# Patient Record
Sex: MALE
[De-identification: names, ages, dates, MRNs, and addresses within clinical notes are randomized per-mention and may not be internally consistent; named-entity substitution may affect disease eponyms.]

---

## 2018-07-05 ENCOUNTER — HOSPITAL ENCOUNTER (EMERGENCY)
Dept: HOSPITAL 5 - ED | Age: 43
LOS: 4 days | Discharge: PSYCHIATRIC OR SUBSTANCE USE FACILITY | End: 2018-07-09
Payer: MEDICARE

## 2018-07-05 DIAGNOSIS — F12.10: ICD-10-CM

## 2018-07-05 DIAGNOSIS — F29: Primary | ICD-10-CM

## 2018-07-05 DIAGNOSIS — Z79.82: ICD-10-CM

## 2018-07-05 DIAGNOSIS — F15.10: ICD-10-CM

## 2018-07-05 DIAGNOSIS — I10: ICD-10-CM

## 2018-07-05 DIAGNOSIS — F17.200: ICD-10-CM

## 2018-07-05 LAB
ALBUMIN SERPL-MCNC: 4.2 G/DL (ref 3.9–5)
ALT SERPL-CCNC: 22 UNITS/L (ref 7–56)
BUN SERPL-MCNC: 10 MG/DL (ref 9–20)
BUN/CREAT SERPL: 9 %
CALCIUM SERPL-MCNC: 9.8 MG/DL (ref 8.4–10.2)
HCT VFR BLD CALC: 45.5 % (ref 35.5–45.6)
HEMOLYSIS INDEX: 8
HGB BLD-MCNC: 15.2 GM/DL (ref 11.8–15.2)
MCH RBC QN AUTO: 29 PG (ref 28–32)
MCHC RBC AUTO-ENTMCNC: 33 % (ref 32–34)
MCV RBC AUTO: 88 FL (ref 84–94)
PLATELET # BLD: 292 K/MM3 (ref 140–440)
RBC # BLD AUTO: 5.19 M/MM3 (ref 3.65–5.03)

## 2018-07-05 PROCEDURE — 96372 THER/PROPH/DIAG INJ SC/IM: CPT

## 2018-07-05 PROCEDURE — 99285 EMERGENCY DEPT VISIT HI MDM: CPT

## 2018-07-05 PROCEDURE — 81001 URINALYSIS AUTO W/SCOPE: CPT

## 2018-07-05 PROCEDURE — G0480 DRUG TEST DEF 1-7 CLASSES: HCPCS

## 2018-07-05 PROCEDURE — 80061 LIPID PANEL: CPT

## 2018-07-05 PROCEDURE — 80320 DRUG SCREEN QUANTALCOHOLS: CPT

## 2018-07-05 PROCEDURE — 80053 COMPREHEN METABOLIC PANEL: CPT

## 2018-07-05 PROCEDURE — 82550 ASSAY OF CK (CPK): CPT

## 2018-07-05 PROCEDURE — 85027 COMPLETE CBC AUTOMATED: CPT

## 2018-07-05 PROCEDURE — 36415 COLL VENOUS BLD VENIPUNCTURE: CPT

## 2018-07-05 PROCEDURE — 80307 DRUG TEST PRSMV CHEM ANLYZR: CPT

## 2018-07-05 PROCEDURE — 93005 ELECTROCARDIOGRAM TRACING: CPT

## 2018-07-05 PROCEDURE — 83036 HEMOGLOBIN GLYCOSYLATED A1C: CPT

## 2018-07-05 PROCEDURE — 93010 ELECTROCARDIOGRAM REPORT: CPT

## 2018-07-05 NOTE — EMERGENCY DEPARTMENT REPORT
ED General Adult HPI





- General


Chief complaint: Medical Clearance


Stated complaint: MENTAL EVAL


Time Seen by Provider: 07/05/18 13:24


Source: patient, police, RN notes reviewed


Mode of arrival: Ambulatory


Limitations: Other (patient is disorganized and is actively psychotic)





- History of Present Illness


Initial comments: 





This is a 42-year-old male who was unknown to this provider previously, who is 

brought to the hospital by local Police Department for medical clearance.  

Patient has a history of psychiatric disease.  The patient has no complaints at 

this time.  As per Police Department, the patient was aggressive, physically 

combative, and had to be tased 2.  The patient denies headache, neck pain, 

chest pain, abdominal pain, shortness of breath, urinary symptoms, he denies 

homicidality and suicidality, and he reports he is up-to-date with his tetanus 

vaccinations.  The patient has no complaints at this time, and therefore 

indicates no radiation, no qualitative nature of symptoms, and no exacerbating 

or relieving factors.  As per verbal report from local Police Department, the 

patient did not fall or hit his head.











-: unknown


Quality: other (per hpi)


Consistency: other (per hpi)


Improves with: other (per hpi)


Worsens with: other (per hpi)


Associated Symptoms: other (per hpi).  denies: confusion, chest pain, cough, 

diaphoresis, fever/chills, headaches, loss of appetite, malaise, nausea/vomiting

, rash, seizure, shortness of breath, syncope, weakness





- Related Data


 Home Medications











 Medication  Instructions  Recorded  Confirmed  Last Taken


 


Aspirin [Aspirin TAB] 1 PO DAILY 10/08/15 10/08/15 Unknown








 Previous Rx's











 Medication  Instructions  Recorded  Last Taken  Type


 


Amoxicillin/K Clav Tab [Augmentin 1 tab PO Q12HR #20 tab 10/17/15 Unknown Rx





875 mg]    


 


Ibuprofen [Motrin] 600 mg PO Q8H PRN #30 tablet 10/17/15 Unknown Rx


 


Prednisone [predniSONE 10 mg 10 mg PO .TAPER #1 tab.ds.pk 10/17/15 Unknown Rx





(6-Day Pack, 21 Tabs)]    











 Allergies











Allergy/AdvReac Type Severity Reaction Status Date / Time


 


No Known Allergies Allergy   Unverified 10/17/15 12:02














ED Review of Systems


ROS: 


Stated complaint: MENTAL EVAL


Other details as noted in HPI





Comment: All other systems reviewed and negative





ED Past Medical Hx





- Past Medical History


Hx Hypertension: Yes


Hx Psychiatric Treatment: Yes (PTSD)





- Surgical History


Additional Surgical History: L eye.  tonsillectomy





- Social History


Smoking Status: Current Every Day Smoker


Substance Use Type: Marijuana, Methamphetamines





- Medications


Home Medications: 


 Home Medications











 Medication  Instructions  Recorded  Confirmed  Last Taken  Type


 


Aspirin [Aspirin TAB] 1 PO DAILY 10/08/15 10/08/15 Unknown History


 


Amoxicillin/K Clav Tab [Augmentin 1 tab PO Q12HR #20 tab 10/17/15  Unknown Rx





875 mg]     


 


Ibuprofen [Motrin] 600 mg PO Q8H PRN #30 tablet 10/17/15  Unknown Rx


 


Prednisone [predniSONE 10 mg 10 mg PO .TAPER #1 tab.ds.pk 10/17/15  Unknown Rx





(6-Day Pack, 21 Tabs)]     














ED Physical Exam





- General


Limitations: Other (patient is actively psychotic)


General appearance: alert, in no apparent distress





- Head


Head exam: Present: atraumatic, normocephalic





- Eye


Eye exam: Present: normal appearance, PERRL, EOMI.  Absent: nystagmus





- ENT


ENT exam: Present: normal exam, normal orophraynx, mucous membranes moist, 

normal external ear exam





- Neck


Neck exam: Present: normal inspection.  Absent: tenderness, meningismus





- Respiratory


Respiratory exam: Present: normal lung sounds bilaterally.  Absent: respiratory 

distress





- Cardiovascular


Cardiovascular Exam: Present: normal rhythm, tachycardia, normal heart sounds.  

Absent: systolic murmur, diastolic murmur, rubs, gallop





- GI/Abdominal


GI/Abdominal exam: Present: soft, normal bowel sounds.  Absent: distended, 

tenderness, guarding, rebound, rigid, pulsatile mass





- Rectal


Rectal exam: Present: deferred





- Extremities Exam


Extremities exam: Present: normal inspection, full ROM, normal capillary refill

, other (there is no palpable cord.  There is negative Homans sign.  The 

compartments are soft.  There is no clonus.  It is no hyperreflexia.).  Absent: 

tenderness, pedal edema, joint swelling, calf tenderness





- Back Exam


Back exam: Present: normal inspection, full ROM.  Absent: tenderness, CVA 

tenderness (R), paraspinal tenderness, vertebral tenderness





- Neurological Exam


Neurological exam: Present: alert, oriented X3, CN II-XII intact, other (

Extraocular movements intact.  Tongue midline.  No facial droop.  Facial 

sensation intact to light touch in the V1, V2, V3 distribution bilaterally.  5 

and 5 strength in 4 extremities..  Sensation is intact to light touch in 4 

extremities.).  Absent: motor sensory deficit





- Psychiatric


Psychiatric exam: Present: anxious.  Absent: homicidal ideation, suicidal 

ideation





- Skin


Skin exam: Present: warm, dry, intact, normal color.  Absent: rash





ED Course


 Vital Signs











  07/05/18 07/05/18 07/05/18





  13:10 13:30 14:00


 


Temperature 97.7 F  


 


Pulse Rate 113 H 109 H 104 H


 


Respiratory 18 26 H 19





Rate   


 


Blood Pressure 153/91  


 


Blood Pressure 153/91  





[Right]   


 


O2 Sat by Pulse 98 97 98





Oximetry   














  07/05/18 07/05/18 07/05/18





  14:06 14:30 15:00


 


Temperature 99.2 F  


 


Pulse Rate  102 H 94 H


 


Respiratory  29 H 





Rate   


 


Blood Pressure  159/93 141/79


 


Blood Pressure   





[Right]   


 


O2 Sat by Pulse  98 98





Oximetry   














ED Medical Decision Making





- Lab Data


Result diagrams: 


 07/05/18 13:41





 07/05/18 13:41








 Vital Signs











  07/05/18 07/05/18 07/05/18





  13:10 13:30 14:00


 


Temperature 97.7 F  


 


Pulse Rate 113 H 109 H 104 H


 


Respiratory 18 26 H 19





Rate   


 


Blood Pressure 153/91  


 


Blood Pressure 153/91  





[Right]   


 


O2 Sat by Pulse 98 97 98





Oximetry   














  07/05/18 07/05/18 07/05/18





  14:06 14:30 15:00


 


Temperature 99.2 F  


 


Pulse Rate  102 H 94 H


 


Respiratory  29 H 





Rate   


 


Blood Pressure  159/93 141/79


 


Blood Pressure   





[Right]   


 


O2 Sat by Pulse  98 98





Oximetry   











 Lab Results











  07/05/18 07/05/18 07/05/18 Range/Units





  13:41 13:41 13:41 


 


WBC     (4.5-11.0)  K/mm3


 


RBC     (3.65-5.03)  M/mm3


 


Hgb     (11.8-15.2)  gm/dl


 


Hct     (35.5-45.6)  %


 


MCV     (84-94)  fl


 


MCH     (28-32)  pg


 


MCHC     (32-34)  %


 


RDW     (13.2-15.2)  %


 


Plt Count     (140-440)  K/mm3


 


Sodium  141    (137-145)  mmol/L


 


Potassium  4.4    (3.6-5.0)  mmol/L


 


Chloride  101.1    ()  mmol/L


 


Carbon Dioxide  26    (22-30)  mmol/L


 


Anion Gap  18    mmol/L


 


BUN  10    (9-20)  mg/dL


 


Creatinine  1.1    (0.8-1.5)  mg/dL


 


Estimated GFR  > 60    ml/min


 


BUN/Creatinine Ratio  9    %


 


Glucose  117 H    ()  mg/dL


 


Calcium  9.8    (8.4-10.2)  mg/dL


 


Total Bilirubin  0.20    (0.1-1.2)  mg/dL


 


AST  28    (5-40)  units/L


 


ALT  22    (7-56)  units/L


 


Alkaline Phosphatase  118    ()  units/L


 


Total Creatine Kinase     ()  units/L


 


Total Protein  8.0    (6.3-8.2)  g/dL


 


Albumin  4.2    (3.9-5)  g/dL


 


Albumin/Globulin Ratio  1.1    %


 


Salicylates   < 0.3 L   (2.8-20.0)  mg/dL


 


Acetaminophen    < 5.0 L  (10.0-30.0)  ug/mL


 


Plasma/Serum Alcohol     (0-0.07)  %














  07/05/18 07/05/18 07/05/18 Range/Units





  13:41 13:41 13:41 


 


WBC    13.3 H  (4.5-11.0)  K/mm3


 


RBC    5.19 H  (3.65-5.03)  M/mm3


 


Hgb    15.2  (11.8-15.2)  gm/dl


 


Hct    45.5  (35.5-45.6)  %


 


MCV    88  (84-94)  fl


 


MCH    29  (28-32)  pg


 


MCHC    33  (32-34)  %


 


RDW    14.4  (13.2-15.2)  %


 


Plt Count    292  (140-440)  K/mm3


 


Sodium     (137-145)  mmol/L


 


Potassium     (3.6-5.0)  mmol/L


 


Chloride     ()  mmol/L


 


Carbon Dioxide     (22-30)  mmol/L


 


Anion Gap     mmol/L


 


BUN     (9-20)  mg/dL


 


Creatinine     (0.8-1.5)  mg/dL


 


Estimated GFR     ml/min


 


BUN/Creatinine Ratio     %


 


Glucose     ()  mg/dL


 


Calcium     (8.4-10.2)  mg/dL


 


Total Bilirubin     (0.1-1.2)  mg/dL


 


AST     (5-40)  units/L


 


ALT     (7-56)  units/L


 


Alkaline Phosphatase     ()  units/L


 


Total Creatine Kinase  191 H    ()  units/L


 


Total Protein     (6.3-8.2)  g/dL


 


Albumin     (3.9-5)  g/dL


 


Albumin/Globulin Ratio     %


 


Salicylates     (2.8-20.0)  mg/dL


 


Acetaminophen     (10.0-30.0)  ug/mL


 


Plasma/Serum Alcohol   < 0.01   (0-0.07)  %














- EKG Data


-: EKG Interpreted by Me


EKG shows normal: sinus rhythm


Rate: tachycardia





- EKG Data





07/05/18 16:15


Sinus tachycardia, 114 beats per minute, left axis deviation, low voltage, QTC 

prolonged, abnormal EKG, not a STEMI.





- Medical Decision Making





Differential diagnosis, including but not limited to: Psychosis, medical 

clearance for psychiatric placement


Assessment and plan: 42-year-old male who is brought to the hospital by Police 

Department for decompensated psychosis.  He is afebrile, his tachycardia has 

resolved.  His leukocytosis is appreciated, however his history and physical 

examination are not consistent with an acute bacterial infection.  His 

tachycardia resolved, he has been pleasant, calm and cooperative, without any 

neck pain or neck stiffness.  He is alert to name, month, year and location.  

Urinalysis is pending at this time, he is placed on a 1013, his serum 

toxicology studies were unremarkable, creatinine kinase was unremarkable.  At 

this point in time, the patient is medically stable for psychiatric placement, 

evaluation and consultation at this time.











Critical care attestation.: 


If time is entered above; I have spent that time in minutes in the direct care 

of this critically ill patient, excluding procedure time.








ED Disposition


Clinical Impression: 


 Medical clearance for psychiatric admission





Disposition: [id number]/TX-[id number] PSY HOSP/PSY UNIT


Is pt being admited?: No


Does the pt Need Aspirin: No


Condition: Good

## 2018-07-06 LAB
BENZODIAZEPINES SCREEN,URINE: (no result)
BILIRUB UR QL STRIP: (no result)
BLOOD UR QL VISUAL: (no result)
HDLC SERPL-MCNC: 35 MG/DL (ref 40–59)
METHADONE SCREEN,URINE: (no result)
MUCOUS THREADS #/AREA URNS HPF: (no result) /HPF
OPIATE SCREEN,URINE: (no result)
PH UR STRIP: 6 [PH] (ref 5–7)
PROT UR STRIP-MCNC: (no result) MG/DL
RBC #/AREA URNS HPF: 10 /HPF (ref 0–6)
UROBILINOGEN UR-MCNC: < 2 MG/DL (ref ?–2)
WBC #/AREA URNS HPF: 6 /HPF (ref 0–6)

## 2018-07-06 NOTE — CONSULTATION
History of Present Illness





- Reason for Consult


Consult date: 07/06/18


Reason for consult: Mental Health Evaluation


Requesting physician: DAVIN SYKES





- Chief Complaint


Chief complaint: 


"I don't know why I'm here'








- History of Present Psychiatric Illness


42 y.o. white male presenting to the ER for acute psychosis per the local 

police. Today the patient is clam, but tangent during the assessment. He could 

not explain his actions prior to his admission to the ER. He was asked about 

amphetamine use, he stated, "what, what" several times.  His answers to 

questions were not logical. He had loose associations throughout the interview. 

He did acknowledge that "someone or something" is after him. He isn't a good 

historian at this time.  








Medications and Allergies


 Allergies











Allergy/AdvReac Type Severity Reaction Status Date / Time


 


No Known Allergies Allergy   Unverified 10/17/15 12:02











 Home Medications











 Medication  Instructions  Recorded  Confirmed  Last Taken  Type


 


Aspirin [Aspirin TAB] 1 PO DAILY 10/08/15 10/08/15 Unknown History


 


Amoxicillin/K Clav Tab [Augmentin 1 tab PO Q12HR #20 tab 10/17/15  Unknown Rx





875 mg]     


 


Ibuprofen [Motrin] 600 mg PO Q8H PRN #30 tablet 10/17/15  Unknown Rx


 


Prednisone [predniSONE 10 mg 10 mg PO .TAPER #1 tab.ds.pk 10/17/15  Unknown Rx





(6-Day Pack, 21 Tabs)]     











Active Meds: 


Active Medications





Haloperidol Lactate (Haldol)  5 mg IM Q6HR PRN


   PRN Reason: Agitation


Lorazepam (Ativan)  2 mg IM Q4HR PRN


   PRN Reason: Agitation











Past psychiatric history





- Past Medical History


Past Medical History: hypertension


Past Surgical History: tonsillectomy





- past Psychiatric treatment and history


psychiatric treatment history: 


Several inpatient psy settings. He cannot confirm or deny a fam psy hx.








- Social History


Social history: Lives alone





Mental Status Exam





- Vital signs


 Last Vital Signs











Temp  98 F   07/05/18 19:42


 


Pulse  85   07/05/18 19:42


 


Resp  15   07/06/18 11:54


 


BP  156/98   07/05/18 19:42


 


Pulse Ox  95   07/06/18 11:54














- Exam


Narrative exam: 


MSE:





 Appearance: calm 


 Behavior: regular eye contact


 Speech: regular rate and tone


 Mood: "okay"


 Affect: congruent to mood  


 Thought Process: tangential                      


 Thought Content: denies SI/HI's and VH's, would not confirm or deny AH's, 

delusional, paranoid


 Motor Activity: ambulatory 


 Cognition: A/O x 3


 Insight: poor 


 Judgment: poor  








Results


Result Diagrams: 


 07/05/18 13:41





 07/05/18 13:41


 Abnormal lab results











  07/05/18 07/05/18 07/05/18 Range/Units





  13:41 13:41 13:41 


 


WBC     (4.5-11.0)  K/mm3


 


RBC     (3.65-5.03)  M/mm3


 


Glucose  117 H    ()  mg/dL


 


Total Creatine Kinase     ()  units/L


 


Salicylates   < 0.3 L   (2.8-20.0)  mg/dL


 


Acetaminophen    < 5.0 L  (10.0-30.0)  ug/mL














  07/05/18 07/05/18 Range/Units





  13:41 13:41 


 


WBC   13.3 H  (4.5-11.0)  K/mm3


 


RBC   5.19 H  (3.65-5.03)  M/mm3


 


Glucose    ()  mg/dL


 


Total Creatine Kinase  191 H   ()  units/L


 


Salicylates    (2.8-20.0)  mg/dL


 


Acetaminophen    (10.0-30.0)  ug/mL








All other labs normal.








Assessment and Plan


Assessment and plan: 


Impression: Unspecified Psychosis. Substance Use DO (amphetamines). Today the 

patient is clam, but tangent during the assessment. 





DDx: R/O Schizophrenia Paranoid Type, R/O Substance Induced Psychosis





Recommendation/Plan: Continue 1013 with placement to inpatient psy services. 

Start Zyprexa 5 mg PO HS for psychosis. Discussed possible metabolic side 

effects of Zyprexa with patient. The patient's triglycerides are elevated and 

the ER staff was informed. The benefit of Zyprexa at this time outweigh the 

risk because the patient is psychotic.

## 2018-07-07 NOTE — PROGRESS NOTE
Subjective





- Reason for Consult


Consult date: 07/07/18


Reason for consult: follow up





- Chief Complaint


Chief complaint: 


"When am I going?"








42 y.o. white male presenting to the ER for acute psychosis per the local 

police. He asked when he is going to a hospital. He fell asleep when questions 

were asked. He states he should go to [facility]. Unable to obtain further 

information.











Mental Status Exam





- Vital signs


 Last Vital Signs











Temp  97.6 F   07/07/18 09:00


 


Pulse  70   07/07/18 09:00


 


Resp  20   07/07/18 09:00


 


BP  141/83   07/07/18 09:00


 


Pulse Ox  96   07/07/18 09:00














- Exam


Narrative exam: 





MSE:





 Appearance: calm 


 Behavior: intermittently falling asleep


 Speech: regular rate and tone


 Mood: "okay"


 Affect: congruent to mood  


 Thought Process: limited in scope/logical                    


 Thought Content: unable to assess


 Motor Activity: ambulatory 


 Cognition: A/O x 3


 Insight: poor 


 Judgment: poor 





Assessment and Plan








Impression: Unspecified Psychosis. Substance Use DO (amphetamines). Today the 

patient is calm but offers minimal information.





DDx: R/O Schizophrenia Paranoid Type, R/O Substance Induced Psychosis





Recommendation/Plan: Continue 1013 with placement to inpatient psy services. 

Continue Zyprexa 5 mg PO HS for psychosis. Discussed possible metabolic side 

effects of Zyprexa with patient. The patient's triglycerides are elevated and 

the ER staff was informed. The benefit of Zyprexa at this time outweighs the 

risk because the patient is psychotic.

## 2018-07-08 NOTE — PROGRESS NOTE
Subjective





- Reason for Consult


Consult date: 07/08/18


Reason for consult: follow up





- Chief Complaint


Chief complaint: 


"The meth antman..."








42 y.o. white male presenting to the ER for acute psychosis per the local 

police. He states he should go home. When asked how he is doing, he began 

talking about [employer] robot, the meth antman, and having a device inside 

of him. He was unable to be redirected. 











Mental Status Exam





- Vital signs


 Last Vital Signs











Temp  98.2 F   07/08/18 10:00


 


Pulse  82   07/08/18 10:00


 


Resp  18   07/08/18 10:00


 


BP  157/92   07/08/18 10:00


 


Pulse Ox  100   07/08/18 10:00














- Exam


Narrative exam: 





MSE:





 Appearance: hospital gown


 Behavior: needed redirection


 Speech: rapid


 Mood: indifferent


 Affect: congruent to mood  


 Thought Process:    disorganized              


 Thought Content:  bizarre delusions


 Motor Activity: ambulatory 


 Cognition: A/O x 3


 Insight: poor 


 Judgment: poor 





Assessment and Plan








Impression: Unspecified Psychosis. Substance Use DO (amphetamines). Today he is 

disorganized and delusional.





DDx: R/O Schizophrenia Paranoid Type, R/O Substance Induced Psychosis





Recommendation/Plan: Continue 1013 with placement to inpatient psy services. 

Continue Zyprexa 5 mg PO HS for psychosis. Discussed possible metabolic side 

effects of Zyprexa with patient. The patient's triglycerides are elevated and 

the ER staff was informed. The benefit of Zyprexa at this time outweighs the 

risk because the patient is psychotic.

## 2018-07-09 VITALS — SYSTOLIC BLOOD PRESSURE: 132 MMHG | DIASTOLIC BLOOD PRESSURE: 98 MMHG

## 2024-06-04 ENCOUNTER — P2P PATIENT RECORD (OUTPATIENT)
Age: 49
End: 2024-06-04